# Patient Record
Sex: MALE | Race: WHITE
[De-identification: names, ages, dates, MRNs, and addresses within clinical notes are randomized per-mention and may not be internally consistent; named-entity substitution may affect disease eponyms.]

---

## 2017-02-16 ENCOUNTER — HOSPITAL ENCOUNTER (EMERGENCY)
Dept: HOSPITAL 80 - FED | Age: 67
Discharge: HOME | End: 2017-02-16
Payer: MEDICAID

## 2017-02-16 VITALS
OXYGEN SATURATION: 95 % | TEMPERATURE: 98.1 F | HEART RATE: 85 BPM | DIASTOLIC BLOOD PRESSURE: 90 MMHG | RESPIRATION RATE: 18 BRPM | SYSTOLIC BLOOD PRESSURE: 124 MMHG

## 2017-02-16 DIAGNOSIS — N61.0: Primary | ICD-10-CM

## 2017-02-16 NOTE — EDPHY
H & P


Time Seen by Provider: 02/16/17 15:43


HPI/ROS: 





HPI:  66-year-old male presents to emergency department with chief concern left 

breast infection.  Reports onset of inferior left breast abscess 3 weeks ago 

that he drained himself with a #11 Scalpel.  Has continued to have purulent 

drainage from the area for 2 weeks.  Was discharged from a hospitalization at 

[facility] in December 2016 for left breast infection status post left 

breast augmentation in [facility].  Was treated with IV Vanco and followed 

by infectious disease.  Symptoms resolved.  He denies fever, chills, myalgias, 

fatigue, URI symptoms, shortness of breath, chest pain, abdominal pain, nausea, 

vomiting. He is immunocompetent.  He is a transgender male patient.  He was 

notified by his primary care provider yesterday Dr. Alan Bermudez that he 

needed to begin Cipro for Pseudomonas.  He has a follow-up appoint with primary 

care 930 Monday.





ROS:10 point review of systems is negative other than as stated in HPI


Past Medical/Surgical History: 





Breast augmentation, breast infection, rhinoplasty, transgender male


Social History: 





Transgender male


Smoking Status: Never smoked


Physical Exam: 


Vital signs stable, reviewed by me


General:  Awake, alert, calm, cooperative.  No acute distress.


Head:  Normalocephalic.  Atraumatic.


EENT:  PERRLA.  EOMI.  No pallor or injection. Anicteric. No nystagmus.  


Neck:  Supple, nontender.  No lymphadenopathy.  Full range of motion.  


Respiratory:  Breathing unlabored.  Breath sounds equal bilaterally and clear 

to auscultation.  No adventitious sounds.


CV:  Chest nontender, atraumatic. Heart rate regular.  No murmur, rub, or 

gallop.  Brisk cap refill all extremities.


GI:  Abdomen soft, nontender.  Bowel sounds normoactive and positive x 4 

quadrants.


Neuro:  Alert.  Oriented x 3.  Speech clear. 


Skin:  Skin warm, 4.5 cm of market erythema with mild induration.  No 

fluctuance.  No mass or abscess appreciated.


Extremities:  Full range of motion in all 4 extremities. 


Mental status: Interactive, appropriate, well-groomed.


Constitutional: 


 Initial Vital Signs











Temperature (C)  36.7 C   02/16/17 15:14


 


Heart Rate  85   02/16/17 15:14


 


Respiratory Rate  18   02/16/17 15:14


 


Blood Pressure  124/90 H  02/16/17 15:14


 


O2 Sat (%)  95   02/16/17 15:14








 











O2 Delivery Mode               Room Air














Allergies/Adverse Reactions: 


 





No Known Allergies Allergy (Unverified 04/23/12 13:03)


 








Home Medications: 














 Medication  Instructions  Recorded


 


Spironolactone 200 mg PO HS 11/12/15


 


Tamsulosin HCl [Flomax 0.4 MG (*)] 0.4 mg PO HS 11/12/15


 


Calcium Phosphate Trib/Vit D3 2 each PO HS 10/21/16





[Calcium + Vitamin D3 Gummies]  


 


Estradiol [Estraderm 0.05 MG] 0.05 mg TD WE@21 10/21/16


 


Finasteride [Proscar 5 MG (*)] 5 mg PO HS 10/21/16


 


Prenatal Vit/Iron Fumarate/FA 1 tab PO HS 10/21/16





[Prenatal Tablet]  


 


Doxycycline Hyclate [Vibramycin 100 mg PO BID #20 cap 10/25/16





100 MG (*)]  


 


Ibuprofen [Motrin (*)] 400 mg PO Q4HRS PRN #0 tab 10/25/16


 


Polyethylene Glycol 3350 [Miralax 17 gm PO DAILY #0 pkt 10/25/16





17 gm (*)]  


 


Ciprofloxacin [Cipro] 500 mg PO BID #20 tab 02/16/17














Medical Decision Making


ED Course/Re-evaluation: 





This patient is a 66-year-old male who presents to ED with left breast 

infection.  Had breast augmentation in [place] in the fall.  Was hospitalized 

for a left breast infection and successfully treated with Vanco, switch to doxy 

orally oct 2015.  A left breast culture was performed by primary care last week 

and he was notified that he is growing Pseudomonas and should be started on 

Cipro.  Call has been placed to his primary care provider to confirm culture 

and results as I am unable to find them in the computer.





Spoke to Dr. Alan Bermudez regarding this patient.  Culture was obtained and 

found to grow Pseudomonas.  Alan Bermudez would like this patient to follow up 

with ID.  I have counseled this patient regarding this and he agrees to make a 

follow-up appointment with ID.  He understands to return to emergency 

department should symptoms worsen instead of improve.


Differential Diagnosis: 





Differential diagnosis includes but is not limited to purulent cellulitis, 

abscess, erysipelas, systemic infection, sepsis





- Data Points


Medications Given: 


 








Discontinued Medications





Ciprofloxacin (Cipro)  500 mg PO EDNOW ONE


   PRN Reason: Protocol


   Stop: 02/16/17 15:50


   Last Admin: 02/16/17 15:56 Dose:  500 mg








Departure





- Departure


Disposition: Home, Routine, Self-Care


Clinical Impression: 


 Cellulitis





Condition: Good


Instructions:  Cellulitis (ED)


Additional Instructions: 


Plan:





Cipro antibiotic as prescribed





Take an over-the-counter probiotic and/or eat yogurt while taking this 

antibiotic.





Heat to area 4 times daily for 20 minutes





You may use 600 mg of ibuprofen every 6 hours for fever, inflammation, or pain.

  Always take ibuprofen with food and stay well hydrated while taking.  Do not 

exceed the maximum allowable dose in a 24 hour period which is 2400 mg.





You may use 1000mg of Tylenol every 8 hours.  This may be staggered with the 

ibuprofen.  Do not exceed the maximum dose in a 24 hour period which is 3 GM or 

3000 mg.





Follow up with infectious disease Dr. Gm Montero or 1 of his colleagues by Monday

--When you call to schedule appointment, please let the office know you are an 

"ER follow up" appointment"





Follow up with on-call surgeon listed in your paperwork Dr. Brian Disla 

within the next week--When you call to schedule appointment, please let the 

office know you are an "ER follow up" appointment"





If symptoms worsen over the weekend including fever, feeling sick, vomiting or 

other concerning symptoms return promptly to the emergency department for 

further evaluation and treatment


Referrals: 


Brian Disla MD [Medical Doctor] - As per Instructions


Gm Montero MD [Medical Doctor] - As per Instructions


Prescriptions: 


Ciprofloxacin [Cipro] 500 mg PO BID #20 tab

## 2017-06-06 ENCOUNTER — HOSPITAL ENCOUNTER (OUTPATIENT)
Dept: HOSPITAL 80 - FIMAGING | Age: 67
End: 2017-06-06
Attending: FAMILY MEDICINE
Payer: MEDICAID

## 2017-06-06 DIAGNOSIS — Z79.899: ICD-10-CM

## 2017-06-06 DIAGNOSIS — Z12.31: Primary | ICD-10-CM

## 2017-06-06 PROCEDURE — G0202 SCR MAMMO BI INCL CAD: HCPCS

## 2017-08-26 ENCOUNTER — HOSPITAL ENCOUNTER (OUTPATIENT)
Dept: HOSPITAL 80 - FIMAGING | Age: 67
End: 2017-08-26
Attending: FAMILY MEDICINE
Payer: MEDICAID

## 2017-08-26 DIAGNOSIS — M85.89: Primary | ICD-10-CM

## 2018-01-09 ENCOUNTER — HOSPITAL ENCOUNTER (EMERGENCY)
Dept: HOSPITAL 80 - FED | Age: 68
Discharge: HOME | End: 2018-01-09
Payer: MEDICAID

## 2018-01-09 VITALS — DIASTOLIC BLOOD PRESSURE: 72 MMHG | OXYGEN SATURATION: 95 % | HEART RATE: 72 BPM | SYSTOLIC BLOOD PRESSURE: 112 MMHG

## 2018-01-09 VITALS — RESPIRATION RATE: 16 BRPM | TEMPERATURE: 97.5 F

## 2018-01-09 DIAGNOSIS — M79.672: Primary | ICD-10-CM

## 2018-01-09 PROCEDURE — 0JJW0ZZ INSPECTION OF LOWER EXTREMITY SUBCUTANEOUS TISSUE AND FASCIA, OPEN APPROACH: ICD-10-PCS | Performed by: PHYSICIAN ASSISTANT

## 2018-01-09 NOTE — EDPHY
H & P


Time Seen by Provider: 01/09/18 11:55


HPI/ROS: 





CHIEF COMPLAINT:  Possible foreign body left foot





HISTORY OF PRESENT ILLNESS:  The 67-year-old male presents to the emergency 

department with possible retained foreign bodies left foot.  The patient states 

that he lives on a concrete floor and walks barefoot frequently and thinks that 

he has a piece of wood stuck in the plantar aspect of his left foot.  Patient 

thinks that it has been there for approximately 2 weeks.  He tried exploring 

the wound on his own however he has not been able to bear weight without 

significant pain. He believes his tetanus shot is current.





ROS:  Denies numbness or tingling in his toes, pain in his left ankle, fever, 

chills.


Past Medical/Surgical History: 





Multiple facial surgeries, transgender


Social History: 





Transgender identifies as female


Smoking Status: Never smoked


Physical Exam: 





On examination of his left foot there is a small what appears to be healing 

puncture wound to the plantar aspect of the left foot.  No surrounding redness. 

Tender to palpate.  There is no cellulitis.  No evidence of lymphangitis.  No 

palpable bony tenderness. Normal sensation to light touch with normal 2 point 

discrimination.  Strong dorsalis pedis pulse on the dorsal aspect of the left 

foot.


Constitutional: 


 Initial Vital Signs











Temperature (C)  36.4 C   01/09/18 11:31


 


Heart Rate  76   01/09/18 11:31


 


Respiratory Rate  16   01/09/18 11:31


 


Blood Pressure  106/75   01/09/18 11:31


 


O2 Sat (%)  94   01/09/18 11:31








 











O2 Delivery Mode               Room Air














Allergies/Adverse Reactions: 


 





No Known Allergies Allergy (Verified 01/09/18 11:29)


 








Home Medications: 














 Medication  Instructions  Recorded


 


Cephalexin [Keflex] 500 mg PO QID #28 cap 01/09/18


 


Estrogens, Conjugated  01/09/18


 


Flomax  01/09/18














MDM/Departure





- MDM


Imaging Results: 


 Imaging Impressions





Foot X-Ray  01/09/18 12:07


Impression: Negative. No foreign body.











Procedures: 





After consent was obtained from the patient, his skin was thoroughly cleansed 

with alcohol swab.  1% lidocaine with epinephrine was instilled into the wound 

and elliptical incision was made with #11 Blade scalpel.  The wound was 

thoroughly explored.  No foreign body appreciated.  Minimal bleeding.  The 

wound was then thoroughly irrigated and bacitracin and dressing applied.


ED Course/Re-evaluation: 





67-year-old male with concerns about retained foreign body.  No foreign body 

seen on examination. Patient was given Keflex prescription.  He was also 

encouraged to soak his foot in follow-up with General surgery.





- Depart


Disposition: Home, Routine, Self-Care


Clinical Impression: 


 Left foot pain





Condition: Good


Instructions:  Acute Wounds (ED)


Additional Instructions: 


Keflex 500 mg 4 times daily for 1 week.  Soak your foot in warm water as 

discussed for 15-20 minutes every 2-3 hours especially today and tomorrow.  

Return to the emergency department if you notice any signs or symptoms of 

infection such as redness, swelling, increased pain, fever, purulent drainage.


Prescriptions: 


Cephalexin [Keflex] 500 mg PO QID #28 cap


Referrals: 


Alan Bermudez DO [Primary Care Provider] - 1-2 days without fail


Daisy Rey MD [Medical Doctor] - As per Instructions (General surgeon on-

call)